# Patient Record
Sex: FEMALE | Race: WHITE
[De-identification: names, ages, dates, MRNs, and addresses within clinical notes are randomized per-mention and may not be internally consistent; named-entity substitution may affect disease eponyms.]

---

## 2018-12-12 ENCOUNTER — HOSPITAL ENCOUNTER (INPATIENT)
Dept: HOSPITAL 88 - ER | Age: 73
LOS: 6 days | Discharge: HOME | DRG: 270 | End: 2018-12-18
Attending: INTERNAL MEDICINE | Admitting: INTERNAL MEDICINE
Payer: COMMERCIAL

## 2018-12-12 VITALS — HEIGHT: 62 IN | BODY MASS INDEX: 26.01 KG/M2 | WEIGHT: 141.31 LBS

## 2018-12-12 DIAGNOSIS — N95.2: ICD-10-CM

## 2018-12-12 DIAGNOSIS — N81.4: ICD-10-CM

## 2018-12-12 DIAGNOSIS — N39.0: ICD-10-CM

## 2018-12-12 DIAGNOSIS — D64.9: ICD-10-CM

## 2018-12-12 DIAGNOSIS — N39.46: ICD-10-CM

## 2018-12-12 DIAGNOSIS — I74.5: Primary | ICD-10-CM

## 2018-12-12 DIAGNOSIS — D72.829: ICD-10-CM

## 2018-12-12 DIAGNOSIS — B95.61: ICD-10-CM

## 2018-12-12 DIAGNOSIS — I11.0: ICD-10-CM

## 2018-12-12 DIAGNOSIS — R31.0: ICD-10-CM

## 2018-12-12 DIAGNOSIS — I48.91: ICD-10-CM

## 2018-12-12 DIAGNOSIS — Z88.0: ICD-10-CM

## 2018-12-12 DIAGNOSIS — I50.23: ICD-10-CM

## 2018-12-12 LAB
ALBUMIN SERPL-MCNC: 3.8 G/DL (ref 3.5–5)
ALBUMIN/GLOB SERPL: 1 {RATIO} (ref 0.8–2)
ALP SERPL-CCNC: 73 IU/L (ref 40–150)
ALT SERPL-CCNC: 94 IU/L (ref 0–55)
ANION GAP SERPL CALC-SCNC: 16.1 MMOL/L (ref 8–16)
BASOPHILS # BLD AUTO: 0.1 10*3/UL (ref 0–0.1)
BASOPHILS NFR BLD AUTO: 0.4 % (ref 0–1)
BUN SERPL-MCNC: 36 MG/DL (ref 7–26)
BUN/CREAT SERPL: 31 (ref 6–25)
CALCIUM SERPL-MCNC: 10.1 MG/DL (ref 8.4–10.2)
CHLORIDE SERPL-SCNC: 106 MMOL/L (ref 98–107)
CK SERPL-CCNC: 266 IU/L (ref 29–168)
CO2 SERPL-SCNC: 20 MMOL/L (ref 22–29)
DEPRECATED APTT PLAS QN: 27.8 SECONDS (ref 23.8–35.5)
DEPRECATED INR PLAS: 0.92
DEPRECATED NEUTROPHILS # BLD AUTO: 10.3 10*3/UL (ref 2.1–6.9)
EGFRCR SERPLBLD CKD-EPI 2021: 45 ML/MIN (ref 60–?)
EOSINOPHIL # BLD AUTO: 0.1 10*3/UL (ref 0–0.4)
EOSINOPHIL NFR BLD AUTO: 0.7 % (ref 0–6)
ERYTHROCYTE [DISTWIDTH] IN CORD BLOOD: 12.2 % (ref 11.7–14.4)
GLOBULIN PLAS-MCNC: 3.7 G/DL (ref 2.3–3.5)
GLUCOSE SERPLBLD-MCNC: 199 MG/DL (ref 74–118)
HCT VFR BLD AUTO: 39.7 % (ref 34.2–44.1)
HGB BLD-MCNC: 12.9 G/DL (ref 12–16)
LYMPHOCYTES # BLD: 2.1 10*3/UL (ref 1–3.2)
LYMPHOCYTES NFR BLD AUTO: 15.7 % (ref 18–39.1)
MAGNESIUM SERPL-MCNC: 2.2 MG/DL (ref 1.3–2.1)
MCH RBC QN AUTO: 30.9 PG (ref 28–32)
MCHC RBC AUTO-ENTMCNC: 32.5 G/DL (ref 31–35)
MCV RBC AUTO: 95.2 FL (ref 81–99)
MONOCYTES # BLD AUTO: 1 10*3/UL (ref 0.2–0.8)
MONOCYTES NFR BLD AUTO: 7.1 % (ref 4.4–11.3)
NEUTS SEG NFR BLD AUTO: 75.7 % (ref 38.7–80)
PLATELET # BLD AUTO: 186 X10E3/UL (ref 140–360)
POTASSIUM SERPL-SCNC: 4.1 MMOL/L (ref 3.5–5.1)
PROTHROMBIN TIME: 13.2 SECONDS (ref 11.9–14.5)
RBC # BLD AUTO: 4.17 X10E6/UL (ref 3.6–5.1)
SODIUM SERPL-SCNC: 138 MMOL/L (ref 136–145)

## 2018-12-12 PROCEDURE — 85025 COMPLETE CBC W/AUTO DIFF WBC: CPT

## 2018-12-12 PROCEDURE — 81001 URINALYSIS AUTO W/SCOPE: CPT

## 2018-12-12 PROCEDURE — 85730 THROMBOPLASTIN TIME PARTIAL: CPT

## 2018-12-12 PROCEDURE — 37211 THROMBOLYTIC ART THERAPY: CPT

## 2018-12-12 PROCEDURE — 82550 ASSAY OF CK (CPK): CPT

## 2018-12-12 PROCEDURE — 93005 ELECTROCARDIOGRAM TRACING: CPT

## 2018-12-12 PROCEDURE — 83735 ASSAY OF MAGNESIUM: CPT

## 2018-12-12 PROCEDURE — 51700 IRRIGATION OF BLADDER: CPT

## 2018-12-12 PROCEDURE — 36415 COLL VENOUS BLD VENIPUNCTURE: CPT

## 2018-12-12 PROCEDURE — 84484 ASSAY OF TROPONIN QUANT: CPT

## 2018-12-12 PROCEDURE — 83605 ASSAY OF LACTIC ACID: CPT

## 2018-12-12 PROCEDURE — 80048 BASIC METABOLIC PNL TOTAL CA: CPT

## 2018-12-12 PROCEDURE — 75710 ARTERY X-RAYS ARM/LEG: CPT

## 2018-12-12 PROCEDURE — 96372 THER/PROPH/DIAG INJ SC/IM: CPT

## 2018-12-12 PROCEDURE — 93926 LOWER EXTREMITY STUDY: CPT

## 2018-12-12 PROCEDURE — 85610 PROTHROMBIN TIME: CPT

## 2018-12-12 PROCEDURE — 71045 X-RAY EXAM CHEST 1 VIEW: CPT

## 2018-12-12 PROCEDURE — 83880 ASSAY OF NATRIURETIC PEPTIDE: CPT

## 2018-12-12 PROCEDURE — 37224: CPT

## 2018-12-12 PROCEDURE — 82553 CREATINE MB FRACTION: CPT

## 2018-12-12 PROCEDURE — 80053 COMPREHEN METABOLIC PANEL: CPT

## 2018-12-12 PROCEDURE — 96365 THER/PROPH/DIAG IV INF INIT: CPT

## 2018-12-12 PROCEDURE — 96374 THER/PROPH/DIAG INJ IV PUSH: CPT

## 2018-12-12 PROCEDURE — 36247 INS CATH ABD/L-EXT ART 3RD: CPT

## 2018-12-12 PROCEDURE — 84479 ASSAY OF THYROID (T3 OR T4): CPT

## 2018-12-12 PROCEDURE — 99284 EMERGENCY DEPT VISIT MOD MDM: CPT

## 2018-12-12 PROCEDURE — 82948 REAGENT STRIP/BLOOD GLUCOSE: CPT

## 2018-12-12 PROCEDURE — 84443 ASSAY THYROID STIM HORMONE: CPT

## 2018-12-12 PROCEDURE — 80061 LIPID PANEL: CPT

## 2018-12-12 PROCEDURE — 84436 ASSAY OF TOTAL THYROXINE: CPT

## 2018-12-12 PROCEDURE — 93306 TTE W/DOPPLER COMPLETE: CPT

## 2018-12-12 PROCEDURE — 74178 CT ABD&PLV WO CNTR FLWD CNTR: CPT

## 2018-12-12 PROCEDURE — 87086 URINE CULTURE/COLONY COUNT: CPT

## 2018-12-12 PROCEDURE — 87186 SC STD MICRODIL/AGAR DIL: CPT

## 2018-12-12 NOTE — DIAGNOSTIC IMAGING REPORT
EXAMINATION:  CHEST SINGLE (PORTABLE)    



INDICATION: Left leg numb. Arterial occlusion.     



COMPARISON:  None

     

FINDINGS:  AP view   



TUBES and LINES:  None.



LUNGS:  Lungs are well inflated. Interstitial opacities extend from the micah to

the periphery.



PLEURA:  No pleural effusion or pneumothorax.



HEART AND MEDIASTINUM:  Mild to moderate enlargement of the cardiac silhouette.

Aortic calcifications.    



BONES AND SOFT TISSUES:  No acute osseous lesion.  Soft tissues are

unremarkable.



UPPER ABDOMEN: No free air under the diaphragm.    



IMPRESSION: 

Cardiomegaly with interstitial edema.





Signed by: DR. Francisco Rm MD on 12/12/2018 11:47 PM

## 2018-12-13 VITALS — DIASTOLIC BLOOD PRESSURE: 65 MMHG | SYSTOLIC BLOOD PRESSURE: 141 MMHG

## 2018-12-13 VITALS — DIASTOLIC BLOOD PRESSURE: 95 MMHG | SYSTOLIC BLOOD PRESSURE: 135 MMHG

## 2018-12-13 VITALS — DIASTOLIC BLOOD PRESSURE: 69 MMHG | SYSTOLIC BLOOD PRESSURE: 141 MMHG

## 2018-12-13 VITALS — DIASTOLIC BLOOD PRESSURE: 82 MMHG | SYSTOLIC BLOOD PRESSURE: 156 MMHG

## 2018-12-13 VITALS — DIASTOLIC BLOOD PRESSURE: 80 MMHG | SYSTOLIC BLOOD PRESSURE: 141 MMHG

## 2018-12-13 VITALS — SYSTOLIC BLOOD PRESSURE: 131 MMHG | DIASTOLIC BLOOD PRESSURE: 91 MMHG

## 2018-12-13 VITALS — DIASTOLIC BLOOD PRESSURE: 81 MMHG | SYSTOLIC BLOOD PRESSURE: 101 MMHG

## 2018-12-13 VITALS — SYSTOLIC BLOOD PRESSURE: 159 MMHG | DIASTOLIC BLOOD PRESSURE: 93 MMHG

## 2018-12-13 VITALS — SYSTOLIC BLOOD PRESSURE: 144 MMHG | DIASTOLIC BLOOD PRESSURE: 70 MMHG

## 2018-12-13 VITALS — DIASTOLIC BLOOD PRESSURE: 71 MMHG | SYSTOLIC BLOOD PRESSURE: 141 MMHG

## 2018-12-13 VITALS — DIASTOLIC BLOOD PRESSURE: 93 MMHG | SYSTOLIC BLOOD PRESSURE: 159 MMHG

## 2018-12-13 VITALS — DIASTOLIC BLOOD PRESSURE: 63 MMHG | SYSTOLIC BLOOD PRESSURE: 140 MMHG

## 2018-12-13 VITALS — SYSTOLIC BLOOD PRESSURE: 141 MMHG | DIASTOLIC BLOOD PRESSURE: 74 MMHG

## 2018-12-13 LAB
ANION GAP SERPL CALC-SCNC: 17.3 MMOL/L (ref 8–16)
BACTERIA URNS QL MICRO: (no result) /HPF
BASOPHILS # BLD AUTO: 0 10*3/UL (ref 0–0.1)
BASOPHILS # BLD AUTO: 0 10*3/UL (ref 0–0.1)
BASOPHILS # BLD AUTO: 0.1 10*3/UL (ref 0–0.1)
BASOPHILS NFR BLD AUTO: 0.2 % (ref 0–1)
BASOPHILS NFR BLD AUTO: 0.3 % (ref 0–1)
BASOPHILS NFR BLD AUTO: 0.4 % (ref 0–1)
BILIRUB UR QL: NEGATIVE
BNP BLD-MCNC: 238.8 PG/ML (ref 0–100)
BUN SERPL-MCNC: 36 MG/DL (ref 7–26)
BUN/CREAT SERPL: 31 (ref 6–25)
CALCIUM SERPL-MCNC: 9.7 MG/DL (ref 8.4–10.2)
CHLORIDE SERPL-SCNC: 105 MMOL/L (ref 98–107)
CK MB SERPL-MCNC: 1.4 NG/ML (ref 0–5)
CK MB SERPL-MCNC: 1.8 NG/ML (ref 0–5)
CK MB SERPL-MCNC: 2.5 NG/ML (ref 0–5)
CK MB SERPL-MCNC: 3 NG/ML (ref 0–5)
CK SERPL-CCNC: 204 IU/L (ref 29–168)
CK SERPL-CCNC: 424 IU/L (ref 29–168)
CK SERPL-CCNC: 485 IU/L (ref 29–168)
CLARITY UR: (no result)
CO2 SERPL-SCNC: 20 MMOL/L (ref 22–29)
COLOR UR: YELLOW
DEPRECATED APTT PLAS QN: 63.4 SECONDS (ref 23.8–35.5)
DEPRECATED FTI SERPL-MCNC: 2.43 UG/DL (ref 1.4–3.8)
DEPRECATED INR PLAS: 1.56
DEPRECATED NEUTROPHILS # BLD AUTO: 12.6 10*3/UL (ref 2.1–6.9)
DEPRECATED NEUTROPHILS # BLD AUTO: 6.1 10*3/UL (ref 2.1–6.9)
DEPRECATED NEUTROPHILS # BLD AUTO: 8.3 10*3/UL (ref 2.1–6.9)
DEPRECATED RBC URNS MANUAL-ACNC: (no result) /HPF (ref 0–5)
EGFRCR SERPLBLD CKD-EPI 2021: 46 ML/MIN (ref 60–?)
EOSINOPHIL # BLD AUTO: 0 10*3/UL (ref 0–0.4)
EOSINOPHIL NFR BLD AUTO: 0 % (ref 0–6)
EPI CELLS URNS QL MICRO: (no result) /LPF
ERYTHROCYTE [DISTWIDTH] IN CORD BLOOD: 12.4 % (ref 11.7–14.4)
GLUCOSE SERPLBLD-MCNC: 197 MG/DL (ref 74–118)
HCT VFR BLD AUTO: 37.3 % (ref 34.2–44.1)
HCT VFR BLD AUTO: 40.6 % (ref 34.2–44.1)
HCT VFR BLD AUTO: 42.2 % (ref 34.2–44.1)
HGB BLD-MCNC: 12.4 G/DL (ref 12–16)
HGB BLD-MCNC: 13.1 G/DL (ref 12–16)
HGB BLD-MCNC: 13.6 G/DL (ref 12–16)
KETONES UR QL STRIP.AUTO: NEGATIVE
LEUKOCYTE ESTERASE UR QL STRIP.AUTO: (no result)
LYMPHOCYTES # BLD: 0.9 10*3/UL (ref 1–3.2)
LYMPHOCYTES # BLD: 1 10*3/UL (ref 1–3.2)
LYMPHOCYTES # BLD: 1.3 10*3/UL (ref 1–3.2)
LYMPHOCYTES NFR BLD AUTO: 10.1 % (ref 18–39.1)
LYMPHOCYTES NFR BLD AUTO: 16.5 % (ref 18–39.1)
LYMPHOCYTES NFR BLD AUTO: 5.9 % (ref 18–39.1)
MCH RBC QN AUTO: 30.6 PG (ref 28–32)
MCH RBC QN AUTO: 31.3 PG (ref 28–32)
MCH RBC QN AUTO: 32.2 PG (ref 28–32)
MCHC RBC AUTO-ENTMCNC: 32.2 G/DL (ref 31–35)
MCHC RBC AUTO-ENTMCNC: 32.3 G/DL (ref 31–35)
MCHC RBC AUTO-ENTMCNC: 33.2 G/DL (ref 31–35)
MCV RBC AUTO: 94.9 FL (ref 81–99)
MCV RBC AUTO: 96.9 FL (ref 81–99)
MCV RBC AUTO: 97 FL (ref 81–99)
MONOCYTES # BLD AUTO: 0.3 10*3/UL (ref 0.2–0.8)
MONOCYTES # BLD AUTO: 0.6 10*3/UL (ref 0.2–0.8)
MONOCYTES # BLD AUTO: 0.9 10*3/UL (ref 0.2–0.8)
MONOCYTES NFR BLD AUTO: 3.1 % (ref 4.4–11.3)
MONOCYTES NFR BLD AUTO: 6.2 % (ref 4.4–11.3)
MONOCYTES NFR BLD AUTO: 7.6 % (ref 4.4–11.3)
NEUTS SEG NFR BLD AUTO: 75.5 % (ref 38.7–80)
NEUTS SEG NFR BLD AUTO: 86.1 % (ref 38.7–80)
NEUTS SEG NFR BLD AUTO: 87.1 % (ref 38.7–80)
NITRITE UR QL STRIP.AUTO: POSITIVE
NON-SQ EPI CELLS URNS QL MICRO: (no result)
PLATELET # BLD AUTO: 160 X10E3/UL (ref 140–360)
PLATELET # BLD AUTO: 171 X10E3/UL (ref 140–360)
PLATELET # BLD AUTO: 192 X10E3/UL (ref 140–360)
POTASSIUM SERPL-SCNC: 4.3 MMOL/L (ref 3.5–5.1)
PROT UR QL STRIP.AUTO: (no result)
PROTHROMBIN TIME: 20 SECONDS (ref 11.9–14.5)
RBC # BLD AUTO: 3.85 X10E6/UL (ref 3.6–5.1)
RBC # BLD AUTO: 4.28 X10E6/UL (ref 3.6–5.1)
RBC # BLD AUTO: 4.35 X10E6/UL (ref 3.6–5.1)
SODIUM SERPL-SCNC: 138 MMOL/L (ref 136–145)
SP GR UR STRIP: 1.02 (ref 1.01–1.02)
TSH SERPL DL<=0.005 MIU/L-ACNC: 0.46 UIU/ML (ref 0.35–4.94)
UROBILINOGEN UR STRIP-MCNC: 0.2 MG/DL (ref 0.2–1)
WBC #/AREA URNS HPF: >50 /HPF (ref 0–5)

## 2018-12-13 PROCEDURE — 04CD3ZZ EXTIRPATION OF MATTER FROM LEFT COMMON ILIAC ARTERY, PERCUTANEOUS APPROACH: ICD-10-PCS | Performed by: INTERNAL MEDICINE

## 2018-12-13 PROCEDURE — 04CL3ZZ EXTIRPATION OF MATTER FROM LEFT FEMORAL ARTERY, PERCUTANEOUS APPROACH: ICD-10-PCS | Performed by: INTERNAL MEDICINE

## 2018-12-13 PROCEDURE — 3E06317 INTRODUCTION OF OTHER THROMBOLYTIC INTO CENTRAL ARTERY, PERCUTANEOUS APPROACH: ICD-10-PCS | Performed by: INTERNAL MEDICINE

## 2018-12-13 PROCEDURE — 047D3Z1 DILATION OF LEFT COMMON ILIAC ARTERY USING DRUG-COATED BALLOON, PERCUTANEOUS APPROACH: ICD-10-PCS | Performed by: INTERNAL MEDICINE

## 2018-12-13 PROCEDURE — B41G1ZZ FLUOROSCOPY OF LEFT LOWER EXTREMITY ARTERIES USING LOW OSMOLAR CONTRAST: ICD-10-PCS | Performed by: INTERNAL MEDICINE

## 2018-12-13 PROCEDURE — 04CJ3Z6: ICD-10-PCS | Performed by: INTERNAL MEDICINE

## 2018-12-13 PROCEDURE — 047L3Z1 DILATION OF LEFT FEMORAL ARTERY USING DRUG-COATED BALLOON, PERCUTANEOUS APPROACH: ICD-10-PCS | Performed by: INTERNAL MEDICINE

## 2018-12-13 RX ADMIN — SODIUM CHLORIDE SCH GM: 9 INJECTION, SOLUTION INTRAVENOUS at 01:52

## 2018-12-13 RX ADMIN — SODIUM CHLORIDE SCH MLS/HR: 9 INJECTION, SOLUTION INTRAVENOUS at 14:30

## 2018-12-13 RX ADMIN — INSULIN LISPRO SCH UNIT: 100 INJECTION, SOLUTION INTRAVENOUS; SUBCUTANEOUS at 11:30

## 2018-12-13 RX ADMIN — SODIUM CHLORIDE SCH GM: 9 INJECTION, SOLUTION INTRAVENOUS at 18:04

## 2018-12-13 RX ADMIN — INSULIN LISPRO SCH UNIT: 100 INJECTION, SOLUTION INTRAVENOUS; SUBCUTANEOUS at 07:30

## 2018-12-13 RX ADMIN — FAMOTIDINE SCH MG: 10 INJECTION, SOLUTION INTRAVENOUS at 08:27

## 2018-12-13 RX ADMIN — INSULIN LISPRO SCH UNIT: 100 INJECTION, SOLUTION INTRAVENOUS; SUBCUTANEOUS at 16:30

## 2018-12-13 RX ADMIN — CARVEDILOL SCH MG: 12.5 TABLET, FILM COATED ORAL at 17:00

## 2018-12-13 RX ADMIN — INSULIN LISPRO SCH UNIT: 100 INJECTION, SOLUTION INTRAVENOUS; SUBCUTANEOUS at 21:00

## 2018-12-13 RX ADMIN — SODIUM CHLORIDE SCH MLS/HR: 9 INJECTION, SOLUTION INTRAVENOUS at 08:21

## 2018-12-13 NOTE — NUR
REPORT GIVEN BY OFF GOING NURSE HA RN, REPORT GIVEN AT PT'S BEDSIDE., PT AAOX3, 
BREATHING EVEN AND UNLABORED. LOVING CATH HAS SHAKA HEMATURIA IN IT. ORDER PASSED ON BY OFF 
GOING NURSE TO INSERT 24FR WITH 5-10ML BULB AND IRRIGATE PRN. INFORMED PAT RN CHARGE WHO 
WENT TO OBTAIN LOVING CATH. PT RESTING.

## 2018-12-13 NOTE — NUR
RECEIVED PT AAOX3, BRATHING EVEN AND UNLABORED, LEFT LEG COOL TO TOUCH, TOES SLIGHTLY 
DISCOLORED LIGHT BLUISH IN COLOR, P ABLE TO WIGGLE TOES, STATES SHE HAS SOME FEELING COMING 
BACK TO LEFT LEG,. RIGHT LEG WITH DRESSING DRY AND INTACT TO RIGHT GROIN. PT AWARE TO KEEP 
RIGHT LEG STRAIGHT, PT IS ALSO TO GO TO CATH LAB AGAIN AT APPROX 1500

## 2018-12-13 NOTE — CONSULTATION
DATE OF CONSULTATION:  December 13, 2018 



CARDIOLOGY CONSULTATION 



HISTORY OF PRESENT ILLNESS:  The patient is a 73-year-old  woman 

with a history of hypertension who presented to the emergency department 

with severe left lower extremity pain.  The pain's onset was acute, severe 

in intensity, associated with a cold sensation and paresthesias.  The 

patient's son is emergency medical services technician and noted that the 

leg had pallor with decreased temperature and sensation.  The patient's 

arterial Doppler showed iliac occlusion.  She underwent peripheral 

angiography with placement of tPA infusion catheter.  She currently is 

feeling much better, and she states that the foot pain is gone and it feels 

much more warm.  She states that she has no past cardiovascular history.  

She is otherwise feeling well. She denies any chest pain or shortness of 

breath. 



REVIEW OF SYSTEMS:  A 12-point review of systems was conducted, and is 

negative other than stated above in the HPI.   



PAST MEDICAL HISTORY:  Hypertension. 



PAST SURGICAL HISTORY:  None recent. 



PAST FAMILY HISTORY:  No premature coronary artery disease or sudden 

cardiac death. 



SOCIAL HISTORY:  No current illicit drug use, alcohol use or tobacco use. 



ALLERGIES:  PENICILLIN. 



MEDICATIONS:  See medication reconciliation form. 





PHYSICAL EXAMINATION 

VITAL SIGNS:  She is afebrile.  Heart rate is 93.  Respirations 14.  Blood 

pressure 160/63.  Oxygen saturation 99% on 2 liters nasal cannula. 

GENERAL:  She is an elderly woman lying comfortably in bed. 

CARDIOVASCULAR: Irregularly irregular, normal rate.  Systolic murmur at the 

apex.  

LUNGS:  Diminished breath sounds in bilateral bases. 

ABDOMEN:  Soft, nontender. 

EXTREMITIES:  Decreased pulses in the left lower extremity.  Foot is warm, 

mild pallor. 

NEUROLOGIC:  No focal deficits noted.  



LABORATORY DATA:  Reviewed and shows hemoglobin of 13.1, creatinine 1.16, 

negative cardiac enzymes.  BNP of 238.  Normal thyroid function test.  INR 

is 0.92.  



Chest x-ray shows cardiomegaly with interstitial edema. 



A 12-lead electrocardiogram shows atrial fibrillation. 



Lower extremity arterial Doppler preprocedure showed an occluded iliac 

artery with monophasic waveforms in the left common femoral artery with no 

flow noted distally.  



A 2D echocardiogram showed left ventricular ejection fraction of 30% to 35% 

with mild to moderate mitral regurgitation and dilated left atrium.  



IMPRESSION 

1. Acute left leg ischemia, status post thrombolytics via an infusion 

catheter. 

2. Acute likely on chronic systolic congestive heart failure. 

3. Atrial fibrillation. 

4. Hypertension. 

 

 RECOMMENDATIONS:  Continue tPA and _____ infusion.  The patient to undergo 

repeat peripheral angiography with possible intervention this afternoon.  

The patient was found to have marked, severe reduction in her left 

ventricular systolic function and will initiate optimal medical therapy for 

heart failure and atrial fibrillation.  The patient will ultimately need 

long-term anticoagulation due to her atrial fibrillation due to an elevated 

CHADS-VASc score.  May consider ischemic evaluation as a cause for heart 

failure, either prior to discharge or as an outpatient.  



 





DD:  12/13/2018 13:02

DT:  12/13/2018 13:05

Job#:  [id number]

## 2018-12-13 NOTE — NUR
SON PATRICK HERE, TO BRING PT HER , THEN LEFT INFORMED HIM PT STABLE AT THIS TIME, 
VERBALIZED UNDERSTANDING.

## 2018-12-13 NOTE — NUR
PT EATING SMALL AMT OF PUDDING AND DRINKING SOME JUICE, TOLERATING WELL. DENIES PAIN AND 
DISCOMFORTS AT THIS TIME. PT AWARE SHE HAS TO LY FLAT AND KEEP HER LEGS STRAIGHT. PT 
VERBALIZED UNDERSTANDING.

## 2018-12-13 NOTE — XMS REPORT
Patient Summary Document

                             Created on: 2018



DINA UNDERWOOD

External Reference #: [id number]

: 1945

Sex: Female



Demographics







                          Address                   [address] [place] [address]

 

                          Home Phone                [phone]

 

                          Preferred Language        Unknown

 

                          Marital Status            Unknown

 

                          Voodoo Affiliation     Unknown

 

                          Race                      Unknown

 

                                        Additional Race(s)  

 

                          Ethnic Group              Unknown





Author







                          Author                    [facility]

 

                          Address                   Unknown

 

                          Phone                     Unavailable







Care Team Providers







                    Care Team Member Name    Role                Phone

 

                    SWEET, A LAIRD      Unavailable         Unavailable







Problems

This patient has no known problems.



Allergies, Adverse Reactions, Alerts

This patient has no known allergies or adverse reactions.



Medications

This patient has no known medications.



Results







           Test Description    Test Time    Test Comments    Text Results    Atomic Results    Result

 Comments

 

                CHEST SINGLE (PORTABLE)    2018 23:44:00                                                   

                                                       [place] [address]      Patient Name: DINA UNDERWOOD                        
          MR #: [id number]                     : 1945                  
                Age/Sex: 73/F  Acct #: [id number]                             
Req #: [id number]  Adm Physician:                                               
      Ordered by: JONO BRAY MD                            Report #: [id number]
[id number]        Location: ER                                      Room/Bed:         
           
___________________________________________________________________________________________________
   Procedure: [id number] DX/CHEST SINGLE (PORTABLE)  Exam Date: 18         
                  Exam Time: 2335                                              
REPORT STATUS: Signed    EXAMINATION:  CHEST SINGLE (PORTABLE)          INDIC
ATION: Left leg numb. Arterial occlusion.           COMPARISON:  None           
FINDINGS:  AP view         TUBES and LINES:  None.      LUNGS:  Lungs are well 
inflated. Interstitial opacities extend from the micah to   the periphery.      
PLEURA:  No pleural effusion or pneumothorax.      HEART AND MEDIASTINUM:  Mild 
to moderate enlargement of the cardiac silhouette.   Aortic calcifications.     
    BONES AND SOFT TISSUES:  No acute osseous lesion.  Soft tissues are   
unremarkable.      UPPER ABDOMEN: No free air under the diaphragm.          
IMPRESSION:    Cardiomegaly with interstitial edema.         Signed by: DR. Francisco Rm MD on 2018 11:47 PM        Dictated By: FRANCISCO RM MD  
Electronically Signed By: FRANCISCO RM MD on 18  Transcribed By: 
CLINTON on 18       COPY TO:   JONO BRAY MD

## 2018-12-13 NOTE — NUR
consult called to Dr. Batres office. spoke with Jj at answering service. waiting for call 
back for further orders

## 2018-12-13 NOTE — NUR
ASSESSMENT: Spiritual concern

Pt continues to mourn for daughter. Pt states her daughter  last year. 



Intervention:  Provided empathic listening and prayer.



Outcome: Pt expressed appreciation for visit.





NASIR POLANCO



Spiritual Care Department

O: [phone]

Pager: [phone] ([phone] from)

## 2018-12-13 NOTE — NUR
D/C' D 16 FR LOVING CATHETER AND INSERTED 24 FR W/10ML BULB, PT TOLERATED WELL, LOVING 
CONTINUES TO DRAIN SHAKA RED BLOOD.. PT AWARE TO KEEP RT LEG STRAIGHT DUE TO PROCEDURE 
EARLIER TODAY

## 2018-12-13 NOTE — NUR
CALLED RADHA IN [facility] TO ASK HIM HOW TO PUT IN ORDER FOR ANGIOMAX, RADHA 
UNAWARE OF HOW TO TELL ME EXACTLY AND TO WAIT FOR STAFF PHARMACIST TO COME AND GIVE HIM THE 
ORDER, INFORMED CHARGE NURSE.

## 2018-12-14 VITALS — DIASTOLIC BLOOD PRESSURE: 106 MMHG | SYSTOLIC BLOOD PRESSURE: 119 MMHG

## 2018-12-14 VITALS — DIASTOLIC BLOOD PRESSURE: 60 MMHG | SYSTOLIC BLOOD PRESSURE: 118 MMHG

## 2018-12-14 VITALS — DIASTOLIC BLOOD PRESSURE: 89 MMHG | SYSTOLIC BLOOD PRESSURE: 141 MMHG

## 2018-12-14 VITALS — SYSTOLIC BLOOD PRESSURE: 138 MMHG | DIASTOLIC BLOOD PRESSURE: 60 MMHG

## 2018-12-14 VITALS — SYSTOLIC BLOOD PRESSURE: 119 MMHG | DIASTOLIC BLOOD PRESSURE: 53 MMHG

## 2018-12-14 VITALS — SYSTOLIC BLOOD PRESSURE: 126 MMHG | DIASTOLIC BLOOD PRESSURE: 50 MMHG

## 2018-12-14 VITALS — SYSTOLIC BLOOD PRESSURE: 114 MMHG | DIASTOLIC BLOOD PRESSURE: 60 MMHG

## 2018-12-14 VITALS — DIASTOLIC BLOOD PRESSURE: 78 MMHG | SYSTOLIC BLOOD PRESSURE: 100 MMHG

## 2018-12-14 VITALS — DIASTOLIC BLOOD PRESSURE: 60 MMHG | SYSTOLIC BLOOD PRESSURE: 110 MMHG

## 2018-12-14 VITALS — SYSTOLIC BLOOD PRESSURE: 128 MMHG | DIASTOLIC BLOOD PRESSURE: 56 MMHG

## 2018-12-14 VITALS — DIASTOLIC BLOOD PRESSURE: 62 MMHG | SYSTOLIC BLOOD PRESSURE: 137 MMHG

## 2018-12-14 VITALS — DIASTOLIC BLOOD PRESSURE: 71 MMHG | SYSTOLIC BLOOD PRESSURE: 141 MMHG

## 2018-12-14 VITALS — DIASTOLIC BLOOD PRESSURE: 80 MMHG | SYSTOLIC BLOOD PRESSURE: 141 MMHG

## 2018-12-14 VITALS — SYSTOLIC BLOOD PRESSURE: 141 MMHG | DIASTOLIC BLOOD PRESSURE: 67 MMHG

## 2018-12-14 VITALS — DIASTOLIC BLOOD PRESSURE: 66 MMHG | SYSTOLIC BLOOD PRESSURE: 136 MMHG

## 2018-12-14 VITALS — SYSTOLIC BLOOD PRESSURE: 138 MMHG | DIASTOLIC BLOOD PRESSURE: 5 MMHG

## 2018-12-14 VITALS — DIASTOLIC BLOOD PRESSURE: 58 MMHG | SYSTOLIC BLOOD PRESSURE: 113 MMHG

## 2018-12-14 VITALS — SYSTOLIC BLOOD PRESSURE: 123 MMHG | DIASTOLIC BLOOD PRESSURE: 54 MMHG

## 2018-12-14 VITALS — DIASTOLIC BLOOD PRESSURE: 100 MMHG | SYSTOLIC BLOOD PRESSURE: 141 MMHG

## 2018-12-14 VITALS — SYSTOLIC BLOOD PRESSURE: 118 MMHG | DIASTOLIC BLOOD PRESSURE: 51 MMHG

## 2018-12-14 LAB
ALBUMIN SERPL-MCNC: 2.8 G/DL (ref 3.5–5)
ALBUMIN/GLOB SERPL: 1 {RATIO} (ref 0.8–2)
ALP SERPL-CCNC: 54 IU/L (ref 40–150)
ALT SERPL-CCNC: 55 IU/L (ref 0–55)
ANION GAP SERPL CALC-SCNC: 10.7 MMOL/L (ref 8–16)
BASOPHILS # BLD AUTO: 0 10*3/UL (ref 0–0.1)
BASOPHILS NFR BLD AUTO: 0.3 % (ref 0–1)
BUN SERPL-MCNC: 18 MG/DL (ref 7–26)
BUN/CREAT SERPL: 23 (ref 6–25)
CALCIUM SERPL-MCNC: 8.8 MG/DL (ref 8.4–10.2)
CHLORIDE SERPL-SCNC: 107 MMOL/L (ref 98–107)
CHOLEST SERPL-MCNC: 107 MD/DL (ref 0–199)
CHOLEST/HDLC SERPL: 2.9 {RATIO} (ref 3–3.6)
CO2 SERPL-SCNC: 24 MMOL/L (ref 22–29)
DEPRECATED NEUTROPHILS # BLD AUTO: 6.3 10*3/UL (ref 2.1–6.9)
EGFRCR SERPLBLD CKD-EPI 2021: > 60 ML/MIN (ref 60–?)
EOSINOPHIL # BLD AUTO: 0 10*3/UL (ref 0–0.4)
EOSINOPHIL NFR BLD AUTO: 0.3 % (ref 0–6)
ERYTHROCYTE [DISTWIDTH] IN CORD BLOOD: 12.5 % (ref 11.7–14.4)
GLOBULIN PLAS-MCNC: 2.9 G/DL (ref 2.3–3.5)
GLUCOSE SERPLBLD-MCNC: 123 MG/DL (ref 74–118)
HCT VFR BLD AUTO: 35 % (ref 34.2–44.1)
HDLC SERPL-MSCNC: 37 MG/DL (ref 40–60)
HGB BLD-MCNC: 11.2 G/DL (ref 12–16)
LDLC SERPL CALC-MCNC: 52 MG/DL (ref 60–130)
LYMPHOCYTES # BLD: 1.5 10*3/UL (ref 1–3.2)
LYMPHOCYTES NFR BLD AUTO: 17.2 % (ref 18–39.1)
MCH RBC QN AUTO: 31.1 PG (ref 28–32)
MCHC RBC AUTO-ENTMCNC: 32 G/DL (ref 31–35)
MCV RBC AUTO: 97.2 FL (ref 81–99)
MONOCYTES # BLD AUTO: 1 10*3/UL (ref 0.2–0.8)
MONOCYTES NFR BLD AUTO: 11.1 % (ref 4.4–11.3)
NEUTS SEG NFR BLD AUTO: 70.9 % (ref 38.7–80)
PLATELET # BLD AUTO: 142 X10E3/UL (ref 140–360)
POTASSIUM SERPL-SCNC: 3.7 MMOL/L (ref 3.5–5.1)
RBC # BLD AUTO: 3.6 X10E6/UL (ref 3.6–5.1)
SODIUM SERPL-SCNC: 138 MMOL/L (ref 136–145)
TRIGL SERPL-MCNC: 91 MG/DL (ref 0–149)

## 2018-12-14 RX ADMIN — INSULIN LISPRO SCH UNIT: 100 INJECTION, SOLUTION INTRAVENOUS; SUBCUTANEOUS at 07:30

## 2018-12-14 RX ADMIN — INSULIN LISPRO SCH UNIT: 100 INJECTION, SOLUTION INTRAVENOUS; SUBCUTANEOUS at 21:58

## 2018-12-14 RX ADMIN — CARVEDILOL SCH MG: 12.5 TABLET, FILM COATED ORAL at 17:06

## 2018-12-14 RX ADMIN — CARVEDILOL SCH MG: 12.5 TABLET, FILM COATED ORAL at 09:00

## 2018-12-14 RX ADMIN — FAMOTIDINE SCH MG: 10 INJECTION, SOLUTION INTRAVENOUS at 09:00

## 2018-12-14 RX ADMIN — SODIUM CHLORIDE SCH GM: 9 INJECTION, SOLUTION INTRAVENOUS at 02:39

## 2018-12-14 RX ADMIN — METOPROLOL TARTRATE PRN MG: 1 INJECTION, SOLUTION INTRAVENOUS at 17:20

## 2018-12-14 RX ADMIN — SODIUM CHLORIDE SCH GM: 9 INJECTION, SOLUTION INTRAVENOUS at 13:26

## 2018-12-14 RX ADMIN — FAMOTIDINE SCH MG: 10 INJECTION, SOLUTION INTRAVENOUS at 20:50

## 2018-12-14 RX ADMIN — INSULIN LISPRO SCH UNIT: 100 INJECTION, SOLUTION INTRAVENOUS; SUBCUTANEOUS at 16:30

## 2018-12-14 RX ADMIN — INSULIN LISPRO SCH UNIT: 100 INJECTION, SOLUTION INTRAVENOUS; SUBCUTANEOUS at 11:30

## 2018-12-14 RX ADMIN — SODIUM CHLORIDE SCH MLS/HR: 9 INJECTION, SOLUTION INTRAVENOUS at 20:50

## 2018-12-14 RX ADMIN — METOPROLOL TARTRATE PRN MG: 1 INJECTION, SOLUTION INTRAVENOUS at 20:50

## 2018-12-14 RX ADMIN — SODIUM CHLORIDE SCH MLS/HR: 9 INJECTION, SOLUTION INTRAVENOUS at 00:30

## 2018-12-14 RX ADMIN — FAMOTIDINE SCH MG: 10 INJECTION, SOLUTION INTRAVENOUS at 00:19

## 2018-12-14 RX ADMIN — SODIUM CHLORIDE SCH MLS/HR: 9 INJECTION, SOLUTION INTRAVENOUS at 10:30

## 2018-12-14 NOTE — NUR
Right Groin Sheath removed and tolerated well. Pressure applied with wedge for 20 minutes 
and tolerated well. No bleeding noted to right groin site and sterile gauze applied to site 
with elasto tape and tolerated well. V/S are stable.

## 2018-12-14 NOTE — NUR
Metoprolol 5mg IV given at 17:20 for sustained heart rate of 150-180 and Dr. NGOZI Moulton 
notified. Heart rate is  now 92 and is sustained at  bpm and in Atrial fibrillation. 
Denies any chest pain.

## 2018-12-14 NOTE — CONSULTATION
DATE OF CONSULTATION:  2018 



UROLOGY CONSULTATION



REASON FOR CONSULTATION:  Gross hematuria.



HISTORY OF PRESENT ILLNESS:  Shelby Daniel is a 73-year-old woman who 

has never seen a urologist.  The patient denies prior hematuria.  She 

reports only one urinary tract infection in the past.  She does report 

having both stress and urge-type urinary incontinence and they are mild.  

She does have uterine prolapse that has not been addressed due to the fact 

the patient avoids physicians when possible.  The patient was admitted due 

to a cold leg and underwent emergent thrombectomy and stenting of the left 

lower extremity with revascularization successful.  The patient was noted 

to have severe gross hematuria following this procedure and urological 

consultation was sought.  I asked the nurse to change the Rubalcava catheter 

out to a large bore Rubalcava catheter and to irrigate.  Since then, the urine 

has finally cleared.  



PAST MEDICAL AND SURGICAL HISTORY

1. Severe peripheral vascular disease status post procedure as mentioned 

above. 

2. Hypertension. 

3.  2, para 2 by normal spontaneous vaginal delivery.  



ALLERGIES:  PENICILLIN.



CURRENT MEDICATIONS:  Please refer to the MAR.



SOCIAL HISTORY:  The patient quit smoking over 20 years ago.  She denied 

smoking, alcohol, and drug use.  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 

raising dogs.  



FAMILY HISTORY:  Noncontributory to the active urological problems.



REVIEW OF SYSTEMS:  As discussed above in the history of present illness 

and past medical history, otherwise negative for all systems.



PHYSICAL EXAMINATION

GENERAL:  A very pleasant 73-year-old woman, lying in bed in no apparent 

distress. 

VITAL SIGNS:  She is currently afebrile.  Vital signs currently stable.

ABDOMEN:  Soft, nondistended, nontender without costovertebral angle 

tenderness.  Kidneys are not palpable, without hepatosplenomegaly.  No 

obvious evidence of hernia. 

GENITOURINARY:  The patient has severe complete total vaginal prolapse with 

cystocele, rectocele, and uterine prolapse all grade 4.  There is a large 

bore Rubalcava catheter in place draining clear urine out.



For the remaining physical examination systems, please refer to the 

admission history and physical on chart as well as the ERT sheet.



LABORATORY STUDIES:  Urine culture so far reveals Staphylococcus aureus and 

sensitivities are pending.  Patient's white blood cell count was elevated 

to 14,470, but today it is 8,950.  Her hemoglobin is low at 11.2.  The 

patient's creatinine is normal at 0.79.  Her calcium is normal at 8.8.  

Urinalysis significant for 6-10 rbc's and greater than 50 wbc's, with many 

bacteria.  



ASSESSMENTS

1. Gross hematuria.

2. Mixed type urinary incontinence.  

3. Uterine prolapse.  

4. Cystocele. 

5. Rectocele. 

6. Atrophic vaginitis. 

7. Rubalcava catheter in situ.

8. Urinary tract infection.

9. Leukocytosis, improved.

10. Mild anemia.



PLANS

1. Patient currently is on antibiotics.  Will await the sensitivities 

and adjust antibiotics accordingly.  

2. I would recommend leaving the Rubalcava catheter in place at this time 

until the patient is fully ambulatory.  

3. The patient will definitely need cystoscopy and retrograde 

pyelograms.  She will also need a hysterectomy in conjunction with a 

sacrocolpopexy with mesh.



Thank you very much for involving us in the care of your patient.  I will 

be happy to follow her along with you as well as an outpatient.



 





DD:  2018 12:03

DT:  2018 12:11

Job#:  [id number] VERÓNICA

## 2018-12-14 NOTE — NUR
NO CHANGES THROUGHOUT THE NIGHT, PT REMAINED STABLE KEPT LEGS STRAIGHT, HEMATURIA CORRECTING 
ITSELF. REPORTED OFF TO ONCDARRELL BRITT.

## 2018-12-14 NOTE — NUR
to bedside to discuss plan of care with patient/family. CM/SW role and care 
transitions discussed. Anticipated discharge plan discussed along with duration of care. 
CM/SW discussed patients right to make decisions in care. CM/SW work hours given.



Patient lives: WITH SON PATRICK UNDERWOOD IN [place], [place] HOME

Admit/Transfer: ED

POA/Emergency contact: PATRICK UNDERWOOD- [phone]

Current/Previous Home Health: NONE

PCP/Follow-up Care: PAITENT WITH PCP. DOES NOT HAVE INFORMATION. SON REQUESTED TO PROVIDE 
INFORMATION PRIOR TO DISCHARGE SO WE CAN SET UP APPOINTMENT

Current/Previous DME: PATIENT WITH WALKER AND WC AT HOME

Other Services: NONE

Employment Status: UNEMPLOYED

Areas of Concerns: MOBILITY AT DISCHARGE. 

Referral Needs: POSSIBLE HOME HEALTH WITH PT EVAL AND TREAT

Education Needs: NONE AT THIS TIME

IMM/MOON given and signed (if applicable): NO

Goal for discharge: PATIENT TO RETURN HOME WITH RESOURCES AND POSSIBLE HOME HEALTH SERVICES 
TO RETURN TO BASELINE MOBILITY



CM left business card at the bedside with contact information. Name and number was also 
written on the patients whiteboard. Patient verbalized understanding of discussion. CM will 
follow-up with ongoing discharge and transition of care needs.

## 2018-12-14 NOTE — PROGRESS NOTE
DATE:  December 14, 2018 



CARDIOLOGY PROGRESS NOTE



SUBJECTIVE:  Patient feels better.  States that her left leg feels "great." 

 No chest pain or shortness of breath or palpitations.



OBJECTIVE   

VITAL SIGNS:  Temperature is 98.5, heart rate is 141, blood pressure is 

88/58, oxygen saturation is 100% on 2 L nasal cannula, respirations are 16. 



GENERAL:  She is a well-appearing elderly woman lying comfortably in bed in 

no apparent distress.  

CARDIOVASCULAR:  She is tachycardic.  She is irregular rhythm.  Normal S1 

and S2.  

LUNGS:  Diminished breath sounds. 

ABDOMEN:  Soft and nontender. 

EXTREMITIES:  The left lower extremity is warm and diminished pulses. 



CARDIOVASCULAR MEDICATIONS:  Reviewed.



LABORATORY DATA:  Reviewed.  Hemoglobin 11.2.  Creatinine is 0.79.  INR is 

1.56.



IMPRESSION

1.  Acute left leg ischemia:  Status post thrombolytics and peripheral 

intervention.

2.  Systolic congestive heart failure.

3.  Atrial fibrillation/flutter.

4.  Hypertension.



PLAN:  Will remove arterial sheath and start anticoagulation with Xarelto 6 

hours after at.  No further interventions are required at this point in 

time.  She has a warm, nonpainful leg with 2-vessel runoff.  She has well 

collateralized segments of her iliac and femoral system.  Will continue 

optimal medical therapy for her heart failure.  Will start amiodarone 

infusion for her tachycardia and atrial fibrillation.  Otherwise, continue 

close hemodynamically and telemetry monitoring.











DD:  12/14/2018 16:06

DT:  12/14/2018 16:16

Job#:  [id number]

## 2018-12-15 VITALS — DIASTOLIC BLOOD PRESSURE: 57 MMHG | SYSTOLIC BLOOD PRESSURE: 119 MMHG

## 2018-12-15 VITALS — SYSTOLIC BLOOD PRESSURE: 113 MMHG | DIASTOLIC BLOOD PRESSURE: 30 MMHG

## 2018-12-15 VITALS — DIASTOLIC BLOOD PRESSURE: 43 MMHG | SYSTOLIC BLOOD PRESSURE: 102 MMHG

## 2018-12-15 VITALS — SYSTOLIC BLOOD PRESSURE: 137 MMHG | DIASTOLIC BLOOD PRESSURE: 54 MMHG

## 2018-12-15 VITALS — SYSTOLIC BLOOD PRESSURE: 144 MMHG | DIASTOLIC BLOOD PRESSURE: 49 MMHG

## 2018-12-15 VITALS — DIASTOLIC BLOOD PRESSURE: 80 MMHG | SYSTOLIC BLOOD PRESSURE: 96 MMHG

## 2018-12-15 VITALS — DIASTOLIC BLOOD PRESSURE: 41 MMHG | SYSTOLIC BLOOD PRESSURE: 142 MMHG

## 2018-12-15 VITALS — SYSTOLIC BLOOD PRESSURE: 117 MMHG | DIASTOLIC BLOOD PRESSURE: 51 MMHG

## 2018-12-15 VITALS — SYSTOLIC BLOOD PRESSURE: 122 MMHG | DIASTOLIC BLOOD PRESSURE: 57 MMHG

## 2018-12-15 VITALS — SYSTOLIC BLOOD PRESSURE: 134 MMHG | DIASTOLIC BLOOD PRESSURE: 51 MMHG

## 2018-12-15 VITALS — DIASTOLIC BLOOD PRESSURE: 69 MMHG | SYSTOLIC BLOOD PRESSURE: 86 MMHG

## 2018-12-15 VITALS — SYSTOLIC BLOOD PRESSURE: 129 MMHG | DIASTOLIC BLOOD PRESSURE: 45 MMHG

## 2018-12-15 VITALS — DIASTOLIC BLOOD PRESSURE: 48 MMHG | SYSTOLIC BLOOD PRESSURE: 131 MMHG

## 2018-12-15 VITALS — SYSTOLIC BLOOD PRESSURE: 111 MMHG | DIASTOLIC BLOOD PRESSURE: 50 MMHG

## 2018-12-15 VITALS — SYSTOLIC BLOOD PRESSURE: 143 MMHG | DIASTOLIC BLOOD PRESSURE: 56 MMHG

## 2018-12-15 VITALS — SYSTOLIC BLOOD PRESSURE: 125 MMHG | DIASTOLIC BLOOD PRESSURE: 77 MMHG

## 2018-12-15 VITALS — DIASTOLIC BLOOD PRESSURE: 61 MMHG | SYSTOLIC BLOOD PRESSURE: 141 MMHG

## 2018-12-15 VITALS — DIASTOLIC BLOOD PRESSURE: 52 MMHG | SYSTOLIC BLOOD PRESSURE: 134 MMHG

## 2018-12-15 VITALS — DIASTOLIC BLOOD PRESSURE: 52 MMHG | SYSTOLIC BLOOD PRESSURE: 117 MMHG

## 2018-12-15 VITALS — SYSTOLIC BLOOD PRESSURE: 126 MMHG | DIASTOLIC BLOOD PRESSURE: 49 MMHG

## 2018-12-15 VITALS — SYSTOLIC BLOOD PRESSURE: 134 MMHG | DIASTOLIC BLOOD PRESSURE: 58 MMHG

## 2018-12-15 VITALS — SYSTOLIC BLOOD PRESSURE: 115 MMHG | DIASTOLIC BLOOD PRESSURE: 66 MMHG

## 2018-12-15 LAB
ALBUMIN SERPL-MCNC: 2.6 G/DL (ref 3.5–5)
ALBUMIN/GLOB SERPL: 0.9 {RATIO} (ref 0.8–2)
ALP SERPL-CCNC: 51 IU/L (ref 40–150)
ALT SERPL-CCNC: 33 IU/L (ref 0–55)
ANION GAP SERPL CALC-SCNC: 11.6 MMOL/L (ref 8–16)
BASOPHILS # BLD AUTO: 0 10*3/UL (ref 0–0.1)
BASOPHILS NFR BLD AUTO: 0.3 % (ref 0–1)
BUN SERPL-MCNC: 13 MG/DL (ref 7–26)
BUN/CREAT SERPL: 16 (ref 6–25)
CALCIUM SERPL-MCNC: 8.7 MG/DL (ref 8.4–10.2)
CHLORIDE SERPL-SCNC: 107 MMOL/L (ref 98–107)
CO2 SERPL-SCNC: 25 MMOL/L (ref 22–29)
DEPRECATED APTT PLAS QN: 49.2 SECONDS (ref 23.8–35.5)
DEPRECATED INR PLAS: 2.79
DEPRECATED NEUTROPHILS # BLD AUTO: 6.7 10*3/UL (ref 2.1–6.9)
EGFRCR SERPLBLD CKD-EPI 2021: > 60 ML/MIN (ref 60–?)
EOSINOPHIL # BLD AUTO: 0.1 10*3/UL (ref 0–0.4)
EOSINOPHIL NFR BLD AUTO: 1 % (ref 0–6)
ERYTHROCYTE [DISTWIDTH] IN CORD BLOOD: 12.7 % (ref 11.7–14.4)
GLOBULIN PLAS-MCNC: 2.9 G/DL (ref 2.3–3.5)
GLUCOSE SERPLBLD-MCNC: 146 MG/DL (ref 74–118)
HCT VFR BLD AUTO: 31 % (ref 34.2–44.1)
HGB BLD-MCNC: 9.7 G/DL (ref 12–16)
LYMPHOCYTES # BLD: 1.3 10*3/UL (ref 1–3.2)
LYMPHOCYTES NFR BLD AUTO: 14.1 % (ref 18–39.1)
MCH RBC QN AUTO: 30.9 PG (ref 28–32)
MCHC RBC AUTO-ENTMCNC: 31.3 G/DL (ref 31–35)
MCV RBC AUTO: 98.7 FL (ref 81–99)
MONOCYTES # BLD AUTO: 0.9 10*3/UL (ref 0.2–0.8)
MONOCYTES NFR BLD AUTO: 9.6 % (ref 4.4–11.3)
NEUTS SEG NFR BLD AUTO: 74.6 % (ref 38.7–80)
PLATELET # BLD AUTO: 121 X10E3/UL (ref 140–360)
POTASSIUM SERPL-SCNC: 3.6 MMOL/L (ref 3.5–5.1)
PROTHROMBIN TIME: 31.4 SECONDS (ref 11.9–14.5)
RBC # BLD AUTO: 3.14 X10E6/UL (ref 3.6–5.1)
SODIUM SERPL-SCNC: 140 MMOL/L (ref 136–145)

## 2018-12-15 RX ADMIN — SODIUM CHLORIDE SCH MLS/HR: 9 INJECTION, SOLUTION INTRAVENOUS at 16:30

## 2018-12-15 RX ADMIN — INSULIN LISPRO SCH UNIT: 100 INJECTION, SOLUTION INTRAVENOUS; SUBCUTANEOUS at 07:30

## 2018-12-15 RX ADMIN — SODIUM CHLORIDE SCH MLS/HR: 9 INJECTION, SOLUTION INTRAVENOUS at 06:30

## 2018-12-15 RX ADMIN — SODIUM CHLORIDE SCH GM: 9 INJECTION, SOLUTION INTRAVENOUS at 13:15

## 2018-12-15 RX ADMIN — SODIUM CHLORIDE SCH GM: 9 INJECTION, SOLUTION INTRAVENOUS at 00:45

## 2018-12-15 RX ADMIN — FAMOTIDINE SCH MG: 10 INJECTION, SOLUTION INTRAVENOUS at 21:57

## 2018-12-15 RX ADMIN — AMIODARONE HYDROCHLORIDE SCH MG: 200 TABLET ORAL at 17:17

## 2018-12-15 RX ADMIN — FAMOTIDINE SCH MG: 10 INJECTION, SOLUTION INTRAVENOUS at 09:30

## 2018-12-15 RX ADMIN — INSULIN LISPRO SCH UNIT: 100 INJECTION, SOLUTION INTRAVENOUS; SUBCUTANEOUS at 11:55

## 2018-12-15 RX ADMIN — INSULIN LISPRO SCH UNIT: 100 INJECTION, SOLUTION INTRAVENOUS; SUBCUTANEOUS at 21:58

## 2018-12-15 RX ADMIN — INSULIN LISPRO SCH UNIT: 100 INJECTION, SOLUTION INTRAVENOUS; SUBCUTANEOUS at 16:24

## 2018-12-15 RX ADMIN — CARVEDILOL SCH MG: 12.5 TABLET, FILM COATED ORAL at 09:30

## 2018-12-15 RX ADMIN — CARVEDILOL SCH MG: 12.5 TABLET, FILM COATED ORAL at 17:45

## 2018-12-15 NOTE — PROGRESS NOTE
DATE:  December 15, 2018 



CARDIOLOGY PROGRESS NOTE



SUBJECTIVE:   No major events overnight.  Had her sheath pulled yesterday.  

Received a dose of Xarelto this morning.  Sitting up in rock.  No 

complaints.  No more pain in the left lower extremity.



OBJECTIVE

VITAL SIGNS:  Temperature afebrile, pulse 62, respiratory rate 14, blood 

pressure 134/52, satting 100% on nasal cannula.

GENERAL:  Elderly white female, no acute distress.

CARDIOVASCULAR:  Tachycardic, irregular rhythm.  Normal S1 and S2.

LUNGS:  Diminished breath sounds.

ABDOMEN:  Soft, nontender.

EXTREMITIES:  Left lower extremity is now warm with dopplerable pulses.  

Right lower extremity with palpable pulses.



CARDIOVASCULAR MEDICATIONS:  Reviewed.



LABORATORY DATA:  Reviewed.



ASSESSMENT

1. Acute left leg ischemia, likely due to embolism from atrial 

fibrillation.

2. Acute systolic heart failure.

3. Atrial fibrillation with rapid ventricular response.

4. Hypertension.





PLAN:  Continue Xarelto for anticoagulation.  For atrial fibrillation, 

started on oral amiodarone.  We will up titrate her beta blockers for 

achieving better rate control.  Plan for starting ACE inhibitors tomorrow 

if blood pressure is adequate.



Thank you for this consult.  We will continue to follow.









DD:  12/15/2018 19:59

DT:  12/15/2018 20:17

Job#:  [id number] SHANTI

## 2018-12-16 VITALS — DIASTOLIC BLOOD PRESSURE: 55 MMHG | SYSTOLIC BLOOD PRESSURE: 118 MMHG

## 2018-12-16 VITALS — DIASTOLIC BLOOD PRESSURE: 60 MMHG | SYSTOLIC BLOOD PRESSURE: 118 MMHG

## 2018-12-16 VITALS — DIASTOLIC BLOOD PRESSURE: 106 MMHG | SYSTOLIC BLOOD PRESSURE: 129 MMHG

## 2018-12-16 VITALS — DIASTOLIC BLOOD PRESSURE: 54 MMHG | SYSTOLIC BLOOD PRESSURE: 120 MMHG

## 2018-12-16 VITALS — SYSTOLIC BLOOD PRESSURE: 91 MMHG | DIASTOLIC BLOOD PRESSURE: 49 MMHG

## 2018-12-16 VITALS — DIASTOLIC BLOOD PRESSURE: 45 MMHG | SYSTOLIC BLOOD PRESSURE: 135 MMHG

## 2018-12-16 VITALS — SYSTOLIC BLOOD PRESSURE: 139 MMHG | DIASTOLIC BLOOD PRESSURE: 57 MMHG

## 2018-12-16 VITALS — SYSTOLIC BLOOD PRESSURE: 139 MMHG | DIASTOLIC BLOOD PRESSURE: 56 MMHG

## 2018-12-16 VITALS — SYSTOLIC BLOOD PRESSURE: 98 MMHG | DIASTOLIC BLOOD PRESSURE: 46 MMHG

## 2018-12-16 VITALS — SYSTOLIC BLOOD PRESSURE: 118 MMHG | DIASTOLIC BLOOD PRESSURE: 47 MMHG

## 2018-12-16 VITALS — SYSTOLIC BLOOD PRESSURE: 113 MMHG | DIASTOLIC BLOOD PRESSURE: 46 MMHG

## 2018-12-16 VITALS — SYSTOLIC BLOOD PRESSURE: 131 MMHG | DIASTOLIC BLOOD PRESSURE: 66 MMHG

## 2018-12-16 VITALS — SYSTOLIC BLOOD PRESSURE: 96 MMHG | DIASTOLIC BLOOD PRESSURE: 54 MMHG

## 2018-12-16 VITALS — SYSTOLIC BLOOD PRESSURE: 133 MMHG | DIASTOLIC BLOOD PRESSURE: 61 MMHG

## 2018-12-16 VITALS — DIASTOLIC BLOOD PRESSURE: 48 MMHG | SYSTOLIC BLOOD PRESSURE: 124 MMHG

## 2018-12-16 VITALS — SYSTOLIC BLOOD PRESSURE: 126 MMHG | DIASTOLIC BLOOD PRESSURE: 55 MMHG

## 2018-12-16 VITALS — SYSTOLIC BLOOD PRESSURE: 107 MMHG | DIASTOLIC BLOOD PRESSURE: 48 MMHG

## 2018-12-16 VITALS — DIASTOLIC BLOOD PRESSURE: 61 MMHG | SYSTOLIC BLOOD PRESSURE: 131 MMHG

## 2018-12-16 RX ADMIN — INSULIN LISPRO SCH UNIT: 100 INJECTION, SOLUTION INTRAVENOUS; SUBCUTANEOUS at 16:30

## 2018-12-16 RX ADMIN — CARVEDILOL SCH MG: 12.5 TABLET, FILM COATED ORAL at 17:13

## 2018-12-16 RX ADMIN — INSULIN LISPRO SCH UNIT: 100 INJECTION, SOLUTION INTRAVENOUS; SUBCUTANEOUS at 11:30

## 2018-12-16 RX ADMIN — CARVEDILOL SCH MG: 12.5 TABLET, FILM COATED ORAL at 09:00

## 2018-12-16 RX ADMIN — AMIODARONE HYDROCHLORIDE SCH MG: 200 TABLET ORAL at 09:00

## 2018-12-16 RX ADMIN — INSULIN LISPRO SCH UNIT: 100 INJECTION, SOLUTION INTRAVENOUS; SUBCUTANEOUS at 07:30

## 2018-12-16 RX ADMIN — INSULIN LISPRO SCH UNIT: 100 INJECTION, SOLUTION INTRAVENOUS; SUBCUTANEOUS at 21:17

## 2018-12-16 RX ADMIN — FAMOTIDINE SCH MG: 10 INJECTION, SOLUTION INTRAVENOUS at 09:00

## 2018-12-16 RX ADMIN — SODIUM CHLORIDE SCH GM: 9 INJECTION, SOLUTION INTRAVENOUS at 00:01

## 2018-12-16 RX ADMIN — LOSARTAN POTASSIUM SCH MG: 25 TABLET, FILM COATED ORAL at 09:00

## 2018-12-16 RX ADMIN — AMIODARONE HYDROCHLORIDE SCH MG: 200 TABLET ORAL at 17:13

## 2018-12-16 RX ADMIN — RIVAROXABAN SCH MG: 10 TABLET, FILM COATED ORAL at 17:13

## 2018-12-16 RX ADMIN — FAMOTIDINE SCH MG: 20 TABLET, FILM COATED ORAL at 17:13

## 2018-12-16 RX ADMIN — SODIUM CHLORIDE SCH GM: 9 INJECTION, SOLUTION INTRAVENOUS at 13:23

## 2018-12-16 NOTE — NUR
Room is now ready and report called to BELEM Beard. Patient will be going to Room 200 with 
Tele Box# [phone]. Patient continue to be [id number].

## 2018-12-16 NOTE — PROGRESS NOTE
DATE:  December 16, 2018 



CARDIOLOGY PROGRESS NOTE



SUBJECTIVE:  No major events overnight.



OBJECTIVE

VITAL SIGNS:  Temperature afebrile, pulse 86, respiratory rate 14, blood 

pressure 139/57, and satting 95% on room air.

GENERAL:  Elderly white female, in no acute distress.

CARDIOVASCULAR:  Regular rate rhythm.  No murmurs, rubs, or gallops.  

Palpable carotid pulses.  Palpable radial pulses.  Palpable pedal pulse on 

the right and dopplerable pulse on the left. No peripheral edema or 

varicosities.

LUNGS:  Clear to auscultation bilaterally.

ABDOMEN:  Soft, nontender, nondistended.

NEURO AND PSYCH:  Alert and oriented to person, place, and time.  Normal 

affect.



INPATIENT MEDICATIONS:  Reviewed.



LABORATORY DATA:  Reviewed.



IMAGING DATA:  Reviewed.



TELEMETRY DATA:  Reviewed, shows atrial fibrillation, rate controlled.



ASSESSMENT

1. Acute left leg ischemia, likely due to embolism from atrial 

fibrillation.

2. Acute systolic heart failure.

3. Atrial fibrillation with rapid ventricular response.

4. Hypertension.

5. Sepsis.



PLAN:  Continue Xarelto for anticoagulation for atrial fibrillation.  

Started on amiodarone for rate control.  Up titrate beta-blockers as 

tolerated.  Start ACE inhibitor at a low dose.  Continue Xarelto for 

anticoagulation.  Patient will need ischemic evaluation for her systolic 

heart failure, but this will be done as an outpatient.



Thank you for this consult.  We will continue to follow.









DD:  12/16/2018 06:58

DT:  12/16/2018 07:47

Job#:  [id number] PSO

## 2018-12-16 NOTE — NUR
Received patient in bed, introduced self to patient, patient is alert and oriented x3, 
patient with drainage bag and orders for flushes, patient denies any pain at this time, 
safety and fall precautions maintained as per hospital protocol: bed in lowest position and 
locked, needed items beside bed and call bell placed within patient reach, patient 
instructed to use it to call  nurses for any assistance needed, patient verbalized 
understanding. patient is currently stable will continue to monitor.

-------------------------------------------------------------------------------

Addendum: 12/17/18 at 0005 by Milind Smith RN

-------------------------------------------------------------------------------

Received patient in bed, introduced self to patient, patient is alert and oriented x3, 
patient with pillai bag and orders from DR Batres, no tele, patient denies any pain at this 
time, safety and fall precautions maintained as per hospital protocol: bed in lowest 
position and locked, needed items beside bed and everett langford placed within patient reach, 
patient instructed to use it to call  nurses for any assistance needed, patient verbalized 
understanding. patient is currently stable will continue to monitor.

## 2018-12-16 NOTE — NUR
received pt via WC, AAOx4. resp even and unlabored. denies pain and SOB. telemetry in place. 
PIV to left forearm- SL with dressing C/D/I. call light within reach, instructed to call for 
assistance.

## 2018-12-17 VITALS — DIASTOLIC BLOOD PRESSURE: 58 MMHG | SYSTOLIC BLOOD PRESSURE: 115 MMHG

## 2018-12-17 VITALS — DIASTOLIC BLOOD PRESSURE: 66 MMHG | SYSTOLIC BLOOD PRESSURE: 156 MMHG

## 2018-12-17 VITALS — SYSTOLIC BLOOD PRESSURE: 145 MMHG | DIASTOLIC BLOOD PRESSURE: 66 MMHG

## 2018-12-17 VITALS — SYSTOLIC BLOOD PRESSURE: 124 MMHG | DIASTOLIC BLOOD PRESSURE: 60 MMHG

## 2018-12-17 VITALS — DIASTOLIC BLOOD PRESSURE: 57 MMHG | SYSTOLIC BLOOD PRESSURE: 124 MMHG

## 2018-12-17 VITALS — DIASTOLIC BLOOD PRESSURE: 66 MMHG | SYSTOLIC BLOOD PRESSURE: 145 MMHG

## 2018-12-17 VITALS — DIASTOLIC BLOOD PRESSURE: 50 MMHG | SYSTOLIC BLOOD PRESSURE: 102 MMHG

## 2018-12-17 VITALS — SYSTOLIC BLOOD PRESSURE: 124 MMHG | DIASTOLIC BLOOD PRESSURE: 57 MMHG

## 2018-12-17 LAB
ANION GAP SERPL CALC-SCNC: 11.7 MMOL/L (ref 8–16)
BASOPHILS # BLD AUTO: 0 10*3/UL (ref 0–0.1)
BASOPHILS NFR BLD AUTO: 0.3 % (ref 0–1)
BUN SERPL-MCNC: 15 MG/DL (ref 7–26)
BUN/CREAT SERPL: 18 (ref 6–25)
CALCIUM SERPL-MCNC: 8.8 MG/DL (ref 8.4–10.2)
CHLORIDE SERPL-SCNC: 105 MMOL/L (ref 98–107)
CO2 SERPL-SCNC: 29 MMOL/L (ref 22–29)
DEPRECATED NEUTROPHILS # BLD AUTO: 5.3 10*3/UL (ref 2.1–6.9)
EGFRCR SERPLBLD CKD-EPI 2021: > 60 ML/MIN (ref 60–?)
EOSINOPHIL # BLD AUTO: 0.2 10*3/UL (ref 0–0.4)
EOSINOPHIL NFR BLD AUTO: 2.1 % (ref 0–6)
ERYTHROCYTE [DISTWIDTH] IN CORD BLOOD: 12.6 % (ref 11.7–14.4)
GLUCOSE SERPLBLD-MCNC: 98 MG/DL (ref 74–118)
HCT VFR BLD AUTO: 30 % (ref 34.2–44.1)
HGB BLD-MCNC: 9.6 G/DL (ref 12–16)
LYMPHOCYTES # BLD: 1.3 10*3/UL (ref 1–3.2)
LYMPHOCYTES NFR BLD AUTO: 17.3 % (ref 18–39.1)
MCH RBC QN AUTO: 31.2 PG (ref 28–32)
MCHC RBC AUTO-ENTMCNC: 32 G/DL (ref 31–35)
MCV RBC AUTO: 97.4 FL (ref 81–99)
MONOCYTES # BLD AUTO: 0.7 10*3/UL (ref 0.2–0.8)
MONOCYTES NFR BLD AUTO: 9 % (ref 4.4–11.3)
NEUTS SEG NFR BLD AUTO: 70.8 % (ref 38.7–80)
PLATELET # BLD AUTO: 129 X10E3/UL (ref 140–360)
POTASSIUM SERPL-SCNC: 3.7 MMOL/L (ref 3.5–5.1)
RBC # BLD AUTO: 3.08 X10E6/UL (ref 3.6–5.1)
SODIUM SERPL-SCNC: 142 MMOL/L (ref 136–145)

## 2018-12-17 RX ADMIN — CEFTRIAXONE SCH MLS/HR: 100 INJECTION, POWDER, FOR SOLUTION INTRAVENOUS at 16:15

## 2018-12-17 RX ADMIN — INSULIN LISPRO SCH UNIT: 100 INJECTION, SOLUTION INTRAVENOUS; SUBCUTANEOUS at 07:30

## 2018-12-17 RX ADMIN — RIVAROXABAN SCH MG: 10 TABLET, FILM COATED ORAL at 17:39

## 2018-12-17 RX ADMIN — FAMOTIDINE SCH MG: 20 TABLET, FILM COATED ORAL at 07:30

## 2018-12-17 RX ADMIN — AMIODARONE HYDROCHLORIDE SCH MG: 200 TABLET ORAL at 09:00

## 2018-12-17 RX ADMIN — INSULIN LISPRO SCH UNIT: 100 INJECTION, SOLUTION INTRAVENOUS; SUBCUTANEOUS at 16:30

## 2018-12-17 RX ADMIN — LOSARTAN POTASSIUM SCH MG: 25 TABLET, FILM COATED ORAL at 09:00

## 2018-12-17 RX ADMIN — INSULIN LISPRO SCH UNIT: 100 INJECTION, SOLUTION INTRAVENOUS; SUBCUTANEOUS at 13:28

## 2018-12-17 RX ADMIN — SODIUM CHLORIDE SCH GM: 9 INJECTION, SOLUTION INTRAVENOUS at 13:29

## 2018-12-17 RX ADMIN — FAMOTIDINE SCH MG: 20 TABLET, FILM COATED ORAL at 16:15

## 2018-12-17 RX ADMIN — INSULIN LISPRO SCH UNIT: 100 INJECTION, SOLUTION INTRAVENOUS; SUBCUTANEOUS at 16:17

## 2018-12-17 RX ADMIN — CARVEDILOL SCH MG: 12.5 TABLET, FILM COATED ORAL at 09:00

## 2018-12-17 RX ADMIN — AMIODARONE HYDROCHLORIDE SCH MG: 200 TABLET ORAL at 17:39

## 2018-12-17 RX ADMIN — CARVEDILOL SCH MG: 12.5 TABLET, FILM COATED ORAL at 17:39

## 2018-12-17 RX ADMIN — SODIUM CHLORIDE SCH GM: 9 INJECTION, SOLUTION INTRAVENOUS at 02:21

## 2018-12-17 RX ADMIN — INSULIN LISPRO SCH UNIT: 100 INJECTION, SOLUTION INTRAVENOUS; SUBCUTANEOUS at 20:40

## 2018-12-17 NOTE — NUR
Patient is in bed and she is complaining of generalized pain, stiffness. She has a prolapsed 
vagina that is causing her to retain urine and this is why Dr. Maravilla has inserted a large 
pillai to assist with drainage. She is

## 2018-12-17 NOTE — NUR
Patient condition throughout the night was stable, patient had one episode of blood tinge 
phlegm from nose, patient denies previous history, nares inspected with pen light no blood 
seen, patient denies headache, patient denies dizziness, will report to day nurse to monitor 
patient and report to medical team during rounds.

## 2018-12-17 NOTE — PROGRESS NOTE
DATE:  December 17, 2018 



CARDIOVASCULAR PROGRESS NOTE:  



SUBJECTIVE:  Patient reports mild epistaxis.  No chest pain.  No leg pain.  

No shortness of breath.



OBJECTIVE:

VITAL SIGNS:  Temperature is 97, heart rate is 88, respirations are 20, 

blood pressure is 115/58, oxygen saturation is 99% on room air. 

GENERAL:  Well-appearing, in no apparent distress. 

CARDIOVASCULAR:  Irregularly irregular.

LUNGS:  Clear to auscultation. 

ABDOMEN:  Soft, nontender. 

EXTREMITIES:  Decreased pulses.  



CARDIOVASCULAR MEDICATIONS:  Reviewed.



LABORATORY DATA:  Reviewed.



Telemetry monitoring reveals rate-controlled atrial fibrillation.



IMPRESSION:  

1. Acute left leg ischemia.

2. Acute systolic congestive heart failure.

3. Atrial fibrillation.

4. Hypertension.

5. Sepsis.



PLAN:  Patient is stable from a cardiovascular standpoint.  Continue 

Xarelto for anticoagulation.  

Continue heart failure medications.  Patient may be discharged from a 

cardiovascular standpoint with outpatient ischemic evaluation.









DD:  12/17/2018 16:29

DT:  12/17/2018 16:42

Job#:  [id number]

## 2018-12-17 NOTE — NUR
Patient is experiencing some discomfort with the pillai, the pillai was secured with a stat 
lock and the patient has relief of discomfort. Continue to monitor urine output.

## 2018-12-17 NOTE — NUR
Bedside rounding completed. Patient is in bed and she is resting well. She has a PIV in her 
left forearm, a pillai to gravity, and she is to recieve a CT of the abdomen and pelvis 
today.

## 2018-12-17 NOTE — DIAGNOSTIC IMAGING REPORT
PROCEDURE: CT ABDOMEN & PELVIS W/WO CONTRAST

 

TECHNIQUE: 

The abdomen and pelvis were scanned utilizing a multidetector helical 

scanner from the diaphragm to the lesser trochanter before and after 

the IV administration of 150 cc of Isovue 370 and the oral 

administration of water.  Hematuria protocol was utilized. Coronal and 

sagittal multiplanar reformations were obtained. Low-dose technique was 

utilized.

 

DLP:  865.16 mGy-cm

 

COMPARISON: None.

 

INDICATIONS:   Hematuria

 

FINDINGS:

LOWER THORAX: There is coronary artery calcification. There is a 7.5 mm 

ground glass nodule in the right lower lobe (series 3, image 28). 

Followup CT pulmonary nodular protocol would be of benefit.

 

HEPATOBILIARY: No focal hepatic lesions.  No biliary ductal dilatation.

SPLEEN: No splenomegaly.

PANCREAS: No focal masses or ductal dilatation.

 

ADRENALS: No adrenal nodules.

KIDNEYS/URETERS: No hydronephrosis or solid mass lesions. Calcification 

overlying the right kidney confirmed on the excretory imaging to be 

vascular. Bilateral extrarenal pelvis present. 

PELVIC ORGANS/BLADDER: Rubalcava catheter present within a collapsed 

bladder with partial bladder prolapse. There are 2 stones within a 

portion of the bladder that is prolapsed.

 

PERITONEUM / RETROPERITONEUM: No free air or fluid.

LYMPH NODES: No lymphadenopathy.

VESSELS: Diffuse vascular calcification.

 

GI TRACT: No distention or wall thickening. Diverticulosis.

 

BONES AND SOFT TISSUES: Extensive multilevel degenerative changes of 

the spine.

 

 

 

 

IMPRESSION:

 

1. There is a right lower lobe groundglass pulmonary nodule.

2. Bladder prolapse with 2 stones in the  prolapsed portion. 

3. Colonic diverticulosis without findings of diverticulitis. 

 

Barry Simms D.O.  

Dictated by:  Barry Simms D.O. on 12/17/2018 at 13:58     

Electronically approved by:  Barry Simms D.O. on 12/17/2018 at 13:58

## 2018-12-18 VITALS — SYSTOLIC BLOOD PRESSURE: 154 MMHG | DIASTOLIC BLOOD PRESSURE: 69 MMHG

## 2018-12-18 VITALS — DIASTOLIC BLOOD PRESSURE: 49 MMHG | SYSTOLIC BLOOD PRESSURE: 105 MMHG

## 2018-12-18 VITALS — DIASTOLIC BLOOD PRESSURE: 60 MMHG | SYSTOLIC BLOOD PRESSURE: 123 MMHG

## 2018-12-18 RX ADMIN — LOSARTAN POTASSIUM SCH MG: 25 TABLET, FILM COATED ORAL at 08:59

## 2018-12-18 RX ADMIN — INSULIN LISPRO SCH UNIT: 100 INJECTION, SOLUTION INTRAVENOUS; SUBCUTANEOUS at 07:30

## 2018-12-18 RX ADMIN — FAMOTIDINE SCH MG: 20 TABLET, FILM COATED ORAL at 08:58

## 2018-12-18 RX ADMIN — CEFTRIAXONE SCH MLS/HR: 100 INJECTION, POWDER, FOR SOLUTION INTRAVENOUS at 04:39

## 2018-12-18 RX ADMIN — CARVEDILOL SCH MG: 12.5 TABLET, FILM COATED ORAL at 08:58

## 2018-12-18 RX ADMIN — AMIODARONE HYDROCHLORIDE SCH MG: 200 TABLET ORAL at 08:58

## 2018-12-18 NOTE — NUR
Patient has voided clear, yellow urine without any problems. She is cleared for discharge 
home once voids.

## 2018-12-18 NOTE — NUR
Discharge instructions and prescriptions were given to the patient, she verbalized 
understanding. Pharmacist also made rounds to educate the patient on discharge medications. 
Tele was removed and two iv's removed from left forearm with tips intact.

## 2018-12-18 NOTE — DISCHARGE SUMMARY
PCP:  Dr. Reza Moulton



CONSULTANTS:  Dr. Kingston Moulton, Dr. Mitchell Batres, and Dr. Donnie Bridges.



FINAL DIAGNOSES

1.  Status post left ischemic leg, status post angiogram with intervention, 

thrombectomy and clot cleaning.

2.  Atrial fibrillation with rapid ventricular rate response, improving.

3.  Prolapsed uterus with vaginal problems, pelvic floor incompetency with 

urinary retention, hematuria and urinary tract infection, improving.



SUMMARY:  A 73-year-old female with left ischemic leg.  Patient has severe 

vascular disease.  She underwent intervention and thrombectomy was done, 

and also Angio-Jet and thrombus removed in the profunda of the left lower 

extremity.  The patient is stable.  During that time, the patient also has 

gross hematuria with prolapsed uterus and vaginal floor incompetency.  

Rubalcava catheter was placed and the patient did well.  The patient will need 

surgical intervention at a later date with Dr. Mitchell Batres and GYN 

consultation.  In the meantime, the patient is doing much better.  She is 

stable.  She did have atrial fibrillation with rapid ventricular rate 

response.  The patient is stable now and normal sinus rhythm.  Her ejection 

fraction is approximately 40% to 45% on echocardiogram. She is placed on 

amiodarone and Xarelto.  The patient is stable.  She will go home today.  

She is will go home with the following instructions:

1.  She will resume her Lipitor.

2.  Amiodarone 200 mg twice a day.  

3.  Coreg 6.25 mg b.i.d. 

4.  Xarelto 20 mg daily.  

5.  Losartan 25 mg daily.  

6.  Pepcid 20 mg b.i.d





Patient is stable and discharged home.  Follow up with Dr. Mitchell Batres per 

instructions, Dr. Kingston Moulton, her cardiologist, and GYN in consultation. 

 Will be followed by Dr. Reza Moulton, her PCP.  



Patient is stable.



LAB WORK:  Sodium 142, potassium 3.7, chloride 105, bicarb 29, BUN 15, 

creatinine 0.8, glucose 98.  WBC 7.5, hemoglobin 9.6, hematocrit 30, and 

platelets are 129,000.  INR is 2.7.  The patient is on Xarelto.  AST 28, 

ALT is 33, alkaline phosphatase 51, total bilirubin is 0.7.  Patient is 

stable and discharged home today.  Follow up as instructed.



   

 





DD:  12/18/2018 09:46

DT:  12/18/2018 14:58

Job#:  [id number]

## 2018-12-18 NOTE — NUR
Rubalcava was removed with tip intact per MD order. Patient tolerated well and denies needing 
anything at this time. Explained to call when she has to use the bathroom, she verbalized 
understanding. Call light in reach, bed alarm on.

## 2019-01-16 NOTE — OPERATIVE REPORT
DATE OF PROCEDURE:  December 13, 2018 



INDICATIONS FOR THE PROCEDURE:  Acute left lower extremity limb ischemia.



PREPROCEDURE ASSESSMENT:  The patient's medical history, social history, 

previous experience with anesthesia were reviewed prior to the procedure, 

and the patient was deemed an appropriate candidate for moderate sedation.  

The risks, benefits and alternatives to the procedure were explained to the 

patient, and informed consent was documented in the medical record.



MEDICATIONS:  Please see nursing notes for medications administered during 

the procedure.



PROCEDURES PERFORMED

1. Catheter placement, abdominal aorta.

2. Catheter placement, third order.

3. Angiography of left lower extremity.

4. Primary and secondary thrombectomy of left common iliac, external 

iliac, common femoral and profunda arteries.



PROCEDURE DETAILS:  The patient was brought back to the cardiac 

catheterization laboratory after an overnight infusion of tPA into the left 

lower extremity thrombus.  The patient had return of pulsatile flow to her 

foot by Doppler.  At this point, we already had a sheath in place in an 

up-and-over fashion into the left common iliac artery.  A thrombolysis 

catheter was in place.  We wired this catheter using 0.35 Glide Advantage 

wire.  Catheter was removed, and we proceeded to perform a thrombectomy 

using an AngioJet 6-Australian device.  Multiple passes were made.  Although 

this resulted in excellent result with clearance of 90+% of thrombus, there 

was residual thrombus noted in the ostium of the left profunda artery.  We 

proceeded to redirect our wire into the profunda and then perform multiple 

passes with the AngioJet.  This appeared to be a hardened thrombus as it 

did not yield and will we were not able to aspirate it even after 

attempting to position the sheath near the thrombus and aspirating through 

the 6-Australian sheath.  Attempt was made using a basket of the Spider filter 

to capture the thrombus and retrieve it into the sheath.  However, this was 

also unsuccessful.  After multiple passes with AngioJet and multiple 

ballooning using 6.0 x 200 mm balloon of the SFA and CFA, there was minimal 

residual thrombus.  Patient was continued on IV anticoagulants and 

transferred back to the ICU.  There was pulsatile flow with palpable pulses 

in the left lower extremity distally with excellent runoff to the foot.  

The patient tolerated the procedure well.  We exchanged the long sheath for 

a short sheath at the end of the procedure which will be pulled manually in 

the ICU.  The procedure ended without any complications.



SIGNIFICANT FINDINGS:  Successful thrombectomy of the left common iliac, 

external iliac, common femoral, SFA and profunda with AngioJet thrombectomy 

device.  PTA of the iliac, CFA and SFA using a 6.0 x 200 mm balloon.



ESTIMATED BLOOD LOSS:  150 mL.



COMPLICATIONS:  None.  



IMPLANTS:  None.  



SPECIMEN REMOVED:  None.  



FINAL RECOMMENDATIONS:  Transfer to ICU for further care.  Continue aspirin 

and Xarelto starting tonight.  Will stop anticoagulation when Xarelto is 

started.  







DD:  01/16/2019 09:45

DT:  01/16/2019 14:46

Job#:  [id number]

## 2019-01-16 NOTE — OPERATIVE REPORT
DATE OF PROCEDURE:  December 13, 2018 



INDICATIONS FOR PROCEDURE:  Acute limb ischemia.



PRE-SEDATION ASSESSMENT:  The patient's medical history, social history and 

previous experience with anesthesia was reviewed prior to the procedure.  

Patient was deemed to be an appropriate candidate for moderate sedation.  

The risks, the benefits and alternatives of the procedure were explained to 

the patient and informed consent was obtained as documented in the medical 

record.  



MEDICATIONS:  Please see nursing notes for medications administered during 

the procedure.



PROCEDURES PERFORMED 

1.  Catheter placement in abdominal aorta.

2.  Abdominal aortography.

3.  Thrombolysis catheter placement, left common iliac to common femoral 

artery with tPA infusion.



PROCEDURE DETAILS:  Patient was brought to the cardiac catheterization 

laboratory in a emergent fashion.  Access to the right common femoral 

artery was obtained under fluoroscopic guidance using modified Seldinger 

technique and micropuncture equipment.  A 6-Japanese sheath was inserted in 

the right common femoral artery.  Omni Flush catheter was placed in the 

abdominal aorta over a wire.  Abdominal aortography was performed, which 

showed complete occlusion with thrombus of the left common femoral artery.  

Attempted thrombectomy of the left common iliac artery was performed.  

However, there was severe thrombus burden and this did not yield any 

significant improvement flow into the left lower extremity.  We decided to 

proceed with infusion of intra-arterial tPA.  For this, a 0.035 Glide 

Advantage guidewire was advanced through the thrombus into the distal left 

SFA.  Infusion catheter was then placed with the distal tip in the mid to 

distal common femoral artery.  The proximal infusion port just proximal to 

the thrombus in the left common iliac artery.  Patient was started on 

bivalirudin drip.  The sheath was secured in place with Tegaderm and 

dressing.  The patient was transferred to the ICU for 

infusion of tPA overnight, and will be brought back in several hours for 

further treatment.  The case ended without any complications.  Patient 

tolerated the procedure well.



PERTINENT FINDINGS:  Complete thrombosis of the left common iliac, common 

femoral and profunda arteries.  There is minimal flow into the distal left 

limb, mostly via pelvic collaterals.



ESTIMATED BLOOD LOSS:  20 mL.



SPECIMEN REMOVED:  None.



GRAFTS OR IMPLANTS:  None.



COMPLICATIONS:  None.  



PLAN/RECOMMENDATIONS

1.  Overnight infusion of tPA and bivalirudin IV with close observation in 

the ICU.

2.  Will bring back the patient in the morning for further thrombectomy and 

treatment of left lower extremity limb ischemia.











DD:  01/16/2019 09:37

DT:  01/16/2019 14:51

Job#:  [id number]

## 2022-08-02 ENCOUNTER — HOSPITAL ENCOUNTER (OUTPATIENT)
Dept: HOSPITAL 88 - MAMMO | Age: 77
End: 2022-08-02
Attending: INTERNAL MEDICINE
Payer: MEDICARE

## 2022-08-02 DIAGNOSIS — Z12.31: Primary | ICD-10-CM

## 2022-08-02 PROCEDURE — 77067 SCR MAMMO BI INCL CAD: CPT
